# Patient Record
Sex: MALE | Race: WHITE | NOT HISPANIC OR LATINO | URBAN - METROPOLITAN AREA
[De-identification: names, ages, dates, MRNs, and addresses within clinical notes are randomized per-mention and may not be internally consistent; named-entity substitution may affect disease eponyms.]

---

## 2021-01-12 ENCOUNTER — OFFICE VISIT (OUTPATIENT)
Dept: URBAN - METROPOLITAN AREA CLINIC 12 | Facility: CLINIC | Age: 61
End: 2021-01-12
Payer: COMMERCIAL

## 2021-01-12 VITALS
SYSTOLIC BLOOD PRESSURE: 125 MMHG | HEIGHT: 72 IN | TEMPERATURE: 97.1 F | HEART RATE: 60 BPM | DIASTOLIC BLOOD PRESSURE: 78 MMHG | BODY MASS INDEX: 27.2 KG/M2 | WEIGHT: 200.8 LBS

## 2021-01-12 DIAGNOSIS — K62.9 RECTAL ABNORMALITY: ICD-10-CM

## 2021-01-12 DIAGNOSIS — K76.0 FATTY LIVER: ICD-10-CM

## 2021-01-12 DIAGNOSIS — R74.8 ELEVATED LIVER ENZYMES: ICD-10-CM

## 2021-01-12 DIAGNOSIS — R93.5 ABNORMAL CT OF THE ABDOMEN: ICD-10-CM

## 2021-01-12 PROBLEM — 197321007: Status: ACTIVE | Noted: 2021-01-12

## 2021-01-12 PROBLEM — 713742005: Status: ACTIVE | Noted: 2021-01-12

## 2021-01-12 PROCEDURE — G8420 CALC BMI NORM PARAMETERS: HCPCS | Performed by: INTERNAL MEDICINE

## 2021-01-12 PROCEDURE — G8483 FLU IMM NO ADMIN DOC REA: HCPCS | Performed by: INTERNAL MEDICINE

## 2021-01-12 PROCEDURE — 1036F TOBACCO NON-USER: CPT | Performed by: INTERNAL MEDICINE

## 2021-01-12 PROCEDURE — G8427 DOCREV CUR MEDS BY ELIG CLIN: HCPCS | Performed by: INTERNAL MEDICINE

## 2021-01-12 PROCEDURE — G9903 PT SCRN TBCO ID AS NON USER: HCPCS | Performed by: INTERNAL MEDICINE

## 2021-01-12 PROCEDURE — 99204 OFFICE O/P NEW MOD 45 MIN: CPT | Performed by: INTERNAL MEDICINE

## 2021-01-12 PROCEDURE — 3017F COLORECTAL CA SCREEN DOC REV: CPT | Performed by: INTERNAL MEDICINE

## 2021-01-12 RX ORDER — EMTRICITABINE AND TENOFOVIR DISOPROXIL FUMARATE 200; 300 MG/1; MG/1
1 TABLET TABLET, FILM COATED ORAL ONCE A DAY
Status: ACTIVE | COMMUNITY

## 2021-01-12 RX ORDER — IBUPROFEN 600 MG/1
1 TABLET WITH FOOD OR MILK AS NEEDED TABLET ORAL THREE TIMES A DAY
Status: ACTIVE | COMMUNITY

## 2021-01-12 RX ORDER — SODIUM, POTASSIUM,MAG SULFATES 17.5-3.13G
344MLL AS DIRECTED SOLUTION, RECONSTITUTED, ORAL ORAL
Qty: 1 KIT | Refills: 0 | OUTPATIENT
Start: 2021-01-12

## 2021-01-12 RX ORDER — AMLODIPINE BESYLATE 10 MG/1
1 TABLET TABLET ORAL ONCE A DAY
Status: ACTIVE | COMMUNITY

## 2021-01-12 RX ORDER — DOLUTEGRAVIR SODIUM 50 MG/1
1 TABLET TABLET, FILM COATED ORAL ONCE A DAY
Status: ACTIVE | COMMUNITY

## 2021-01-12 RX ORDER — TAMSULOSIN HYDROCHLORIDE 0.4 MG/1
1 CAPSULE CAPSULE ORAL ONCE A DAY
Status: ACTIVE | COMMUNITY

## 2021-01-12 NOTE — HPI-OTHER HISTORIES
59 yo male presents for new patient visit for GI consultation on referral from [facility].  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 No history of AIDS.  Patient states that he went to [facility] ER on 1/7/21 due to back pain, faitgue and dark urine.    Labs at [facility] on 1/7/21 which showed WBC 7.5, hgb 12.8, plt 250, Na 132, TP 9.9, alb 3.2, , TB 3.2, ALT 50, AST 47, Creat 1.0, , K 4.2. UA with moderate blood, positive bili, >500 protein.  U/S 1/7/21 [facility] cholelithiasis and fatty liver. CT abd/pev 1/7/21 [facility] prominent justino hepatis node 2.6 x 1.5 with scattered mildly prominent retroperitoneal nodes with left periaoartic node 1.2 x 1.0 cm. Mild rectal wall thickening. Presacral nodes 1.9 x 1.2 cm. There are bilateral bibasilar lung nodules.  [facility] which was reportedly normal per patient.  He has no rectal bleeding. No change in bowel habits. No diarrhea. No melena. He has no history of liver disease other than fatty liver. He states that he had [facility] in [facility] which apparently showed fatty liver per patient report.  No cirrhosis per patient.  No history of hepatic decompensation.  He has had 8-9 pound weight loss over the past month. No nausea or vomiting. No ill contacts.

## 2021-01-15 ENCOUNTER — TELEPHONE ENCOUNTER (OUTPATIENT)
Dept: URBAN - METROPOLITAN AREA CLINIC 92 | Facility: CLINIC | Age: 61
End: 2021-01-15

## 2021-01-15 ENCOUNTER — OFFICE VISIT (OUTPATIENT)
Dept: URBAN - METROPOLITAN AREA SURGERY CENTER 18 | Facility: SURGERY CENTER | Age: 61
End: 2021-01-15
Payer: COMMERCIAL

## 2021-01-15 DIAGNOSIS — R93.3 ABN FINDINGS-GI TRACT: ICD-10-CM

## 2021-01-15 DIAGNOSIS — K62.89 ANAL BURNING: ICD-10-CM

## 2021-01-15 DIAGNOSIS — D12.3 ADENOMA OF TRANSVERSE COLON: ICD-10-CM

## 2021-01-15 PROCEDURE — G8907 PT DOC NO EVENTS ON DISCHARG: HCPCS | Performed by: INTERNAL MEDICINE

## 2021-01-15 PROCEDURE — G9937 DIG OR SURV COLSCO: HCPCS | Performed by: INTERNAL MEDICINE

## 2021-01-15 PROCEDURE — 45380 COLONOSCOPY AND BIOPSY: CPT | Performed by: INTERNAL MEDICINE

## 2021-01-15 PROCEDURE — 45385 COLONOSCOPY W/LESION REMOVAL: CPT | Performed by: INTERNAL MEDICINE

## 2021-01-15 RX ORDER — AMLODIPINE BESYLATE 10 MG/1
1 TABLET TABLET ORAL ONCE A DAY
Status: ACTIVE | COMMUNITY

## 2021-01-15 RX ORDER — EMTRICITABINE AND TENOFOVIR DISOPROXIL FUMARATE 200; 300 MG/1; MG/1
1 TABLET TABLET, FILM COATED ORAL ONCE A DAY
Status: ACTIVE | COMMUNITY

## 2021-01-15 RX ORDER — TAMSULOSIN HYDROCHLORIDE 0.4 MG/1
1 CAPSULE CAPSULE ORAL ONCE A DAY
Status: ACTIVE | COMMUNITY

## 2021-01-15 RX ORDER — SODIUM, POTASSIUM,MAG SULFATES 17.5-3.13G
344MLL AS DIRECTED SOLUTION, RECONSTITUTED, ORAL ORAL
Qty: 1 KIT | Refills: 0 | Status: ACTIVE | COMMUNITY
Start: 2021-01-12

## 2021-01-15 RX ORDER — IBUPROFEN 600 MG/1
1 TABLET WITH FOOD OR MILK AS NEEDED TABLET ORAL THREE TIMES A DAY
Status: ACTIVE | COMMUNITY

## 2021-01-15 RX ORDER — DOLUTEGRAVIR SODIUM 50 MG/1
1 TABLET TABLET, FILM COATED ORAL ONCE A DAY
Status: ACTIVE | COMMUNITY

## 2021-01-19 ENCOUNTER — TELEPHONE ENCOUNTER (OUTPATIENT)
Dept: URBAN - METROPOLITAN AREA CLINIC 12 | Facility: CLINIC | Age: 61
End: 2021-01-19

## 2021-02-01 LAB
A/G RATIO: 0.5
ACTIN (SMOOTH MUSCLE) ANTIBODY: 62
ALBUMIN: 3
ALKALINE PHOSPHATASE: 1070
ALPHA-1-ANTITRYPSIN, SERUM: 234
ALT (SGPT): 61
ANA DIRECT: NEGATIVE
AST (SGOT): 59
BASO (ABSOLUTE): 0.1
BASOS: 1
BILIRUBIN, TOTAL: 2.9
BUN/CREATININE RATIO: 11
BUN: 11
CALCIUM: 9
CARBON DIOXIDE, TOTAL: 25
CHLORIDE: 99
CREATININE: 0.96
EGFR IF AFRICN AM: 100
EGFR IF NONAFRICN AM: 86
EOS (ABSOLUTE): 0.1
EOS: 1
FERRITIN, SERUM: 331
GGT: 1079
GLOBULIN, TOTAL: 6.1
GLUCOSE: 108
HBSAG SCREEN: NEGATIVE
HCV AB: <0.1
HEMATOCRIT: 33.5
HEMATOLOGY COMMENTS:: (no result)
HEMOGLOBIN: 11.6
HEP A AB, TOTAL: POSITIVE
HEP B CORE AB, TOT: NEGATIVE
HEPATITIS B SURF AB QUANT: 53.2
IMMATURE CELLS: (no result)
IMMATURE GRANS (ABS): 0.1
IMMATURE GRANULOCYTES: 1
IMMUNOGLOBULIN A, QN, SERUM: 726
INR: 1
INTERPRETATION:: (no result)
LYMPHS (ABSOLUTE): 2.2
LYMPHS: 27
MCH: 30.9
MCHC: 34.6
MCV: 89
MITOCHONDRIAL (M2) ANTIBODY: 65.4
MONOCYTES(ABSOLUTE): 0.6
MONOCYTES: 7
NEUTROPHILS (ABSOLUTE): 5.2
NEUTROPHILS: 63
NRBC: (no result)
PHENOTYPE (PI): (no result)
PLATELETS: 278
POTASSIUM: 3.9
PROTEIN, TOTAL: 9.1
PROTHROMBIN TIME: 10.4
RBC: 3.76
RDW: 15.8
SODIUM: 135
T-TRANSGLUTAMINASE (TTG) IGA: 3
WBC: 8.1

## 2021-02-05 ENCOUNTER — TELEPHONE ENCOUNTER (OUTPATIENT)
Dept: URBAN - METROPOLITAN AREA CLINIC 12 | Facility: CLINIC | Age: 61
End: 2021-02-05

## 2021-02-09 ENCOUNTER — OFFICE VISIT (OUTPATIENT)
Dept: URBAN - METROPOLITAN AREA CLINIC 12 | Facility: CLINIC | Age: 61
End: 2021-02-09
Payer: COMMERCIAL

## 2021-02-09 ENCOUNTER — DASHBOARD ENCOUNTERS (OUTPATIENT)
Age: 61
End: 2021-02-09

## 2021-02-09 DIAGNOSIS — K76.9 LIVER LESION: ICD-10-CM

## 2021-02-09 DIAGNOSIS — R74.8 ABNORMAL LIVER ENZYMES: ICD-10-CM

## 2021-02-09 DIAGNOSIS — R17 JAUNDICE: ICD-10-CM

## 2021-02-09 DIAGNOSIS — R63.4 WEIGHT LOSS: ICD-10-CM

## 2021-02-09 DIAGNOSIS — Z12.11 COLON CANCER SCREENING: ICD-10-CM

## 2021-02-09 DIAGNOSIS — K57.30 DIVERTICULA OF COLON: ICD-10-CM

## 2021-02-09 DIAGNOSIS — Z86.010 HX OF ADENOMATOUS COLONIC POLYPS: ICD-10-CM

## 2021-02-09 DIAGNOSIS — R59.0 ABDOMINAL LYMPHADENOPATHY: ICD-10-CM

## 2021-02-09 DIAGNOSIS — Z21 HIV POSITIVE: ICD-10-CM

## 2021-02-09 PROBLEM — 165816005: Status: ACTIVE | Noted: 2021-02-09

## 2021-02-09 PROBLEM — 733657002: Status: ACTIVE | Noted: 2021-02-09

## 2021-02-09 PROBLEM — 300331000: Status: ACTIVE | Noted: 2021-02-09

## 2021-02-09 PROBLEM — 429047008: Status: ACTIVE | Noted: 2021-02-09

## 2021-02-09 PROCEDURE — 3017F COLORECTAL CA SCREEN DOC REV: CPT | Performed by: INTERNAL MEDICINE

## 2021-02-09 PROCEDURE — 99214 OFFICE O/P EST MOD 30 MIN: CPT | Performed by: INTERNAL MEDICINE

## 2021-02-09 PROCEDURE — G8427 DOCREV CUR MEDS BY ELIG CLIN: HCPCS | Performed by: INTERNAL MEDICINE

## 2021-02-09 PROCEDURE — G8483 FLU IMM NO ADMIN DOC REA: HCPCS | Performed by: INTERNAL MEDICINE

## 2021-02-09 PROCEDURE — G9903 PT SCRN TBCO ID AS NON USER: HCPCS | Performed by: INTERNAL MEDICINE

## 2021-02-09 PROCEDURE — G8420 CALC BMI NORM PARAMETERS: HCPCS | Performed by: INTERNAL MEDICINE

## 2021-02-09 RX ORDER — EMTRICITABINE AND TENOFOVIR DISOPROXIL FUMARATE 200; 300 MG/1; MG/1
1 TABLET TABLET, FILM COATED ORAL ONCE A DAY
Status: ACTIVE | COMMUNITY

## 2021-02-09 RX ORDER — SODIUM, POTASSIUM,MAG SULFATES 17.5-3.13G
344MLL AS DIRECTED SOLUTION, RECONSTITUTED, ORAL ORAL
Qty: 1 KIT | Refills: 0 | Status: DISCONTINUED | COMMUNITY
Start: 2021-01-12

## 2021-02-09 RX ORDER — IBUPROFEN 600 MG/1
1 TABLET WITH FOOD OR MILK AS NEEDED TABLET ORAL THREE TIMES A DAY
Status: ACTIVE | COMMUNITY

## 2021-02-09 RX ORDER — AMLODIPINE BESYLATE 10 MG/1
1 TABLET TABLET ORAL ONCE A DAY
Status: ACTIVE | COMMUNITY

## 2021-02-09 RX ORDER — TAMSULOSIN HYDROCHLORIDE 0.4 MG/1
1 CAPSULE CAPSULE ORAL ONCE A DAY
Status: ACTIVE | COMMUNITY

## 2021-02-09 RX ORDER — DOLUTEGRAVIR SODIUM 50 MG/1
1 TABLET TABLET, FILM COATED ORAL ONCE A DAY
Status: ACTIVE | COMMUNITY

## 2021-02-09 NOTE — HPI-TODAY'S VISIT:
60-year-old gentleman who returns for followup visit. He was seen here in the office on January of 2021 on initial consultation. Of note, he states that he made this visit here as at that time he was unable to go to the [facility] where he gets all of his healthcare. He states that he does not want to come to the private practice clinics any longer as [facility] and states that he is planning [facility] February 12, 2021. He was noted have abdominal discomfort and had an abnormal CT scan of the abdomen at [facility] emergency room and also had abnormal liver enzymes noted and rectal abnormality and was advised have a colonoscopy. He did have the colonoscopy done which showed some adenomatous colon polyp of the rectum was normal and only did show some anal condylomas which she had removed by colorectal surgeon Dr. Leslie Collazo after the colonoscopy. The rectum was otherwise normal-appearing biopsies were taken there by just showed some minimal microscopic changes consistent with bowel prep artifact but no evidence of any chronic disease. He also did have abnormal liver tests noted with jaundice. He also had some lymphadenopathy seen as well. Patient did already have as hepatitis A and B vaccinations previously and was noted to be immune by blood tests. I went over the recent blood tests which showed AST 59, ALT 61, Alkaline phosphatase 1070, GGT 1079, total bilirubin 2.9, Total protein 9.1, globulin 6.1. I also went over his recent MRI of the abdomen done on February 5, 2021 which showed a number of lesions in the liver which were worrisome for an infectious or inflammatory process or even hepatic metastatic disease or lymphoma. Primary have had a neoplasm is less likely. There were gallstones but no evidence of biliary obstruction there is also mild splenomegaly mild abdominal lymphadenopathy which is nonspecific. Patient states that he has actually been feeling better since last visit here and states that he feels like he is less jaundiced. He has had 4 pound weight loss since he was seen here last month. There has been no hematochezia. No abdominal pain of significance. No nausea vomiting. No early satiety no appetite problems. No change of bowel habits. Patient states that [facility] and has seen specialists over there. We do not have results of this. Of note his recent labs last month did show an elevated antimitochondrial antibody and smooth muscle antibody raising concern of even autoimmune liver disease. This was discussed with him. 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

## 2024-05-16 NOTE — PHYSICAL EXAM HENT:
head is normocephalic, atraumatic. Face within normal limits, External ears within normal limits. Patient wearing mask in situ
endoscopy